# Patient Record
Sex: FEMALE | Race: WHITE | NOT HISPANIC OR LATINO | Employment: FULL TIME | URBAN - METROPOLITAN AREA
[De-identification: names, ages, dates, MRNs, and addresses within clinical notes are randomized per-mention and may not be internally consistent; named-entity substitution may affect disease eponyms.]

---

## 2016-01-27 LAB
EXTERNAL HIV CONFIRMATION: NORMAL
EXTERNAL HIV SCREEN: NORMAL
HCV AB SER-ACNC: NEGATIVE

## 2017-05-20 ENCOUNTER — HOSPITAL ENCOUNTER (EMERGENCY)
Facility: HOSPITAL | Age: 27
Discharge: HOME | End: 2017-05-20
Attending: EMERGENCY MEDICINE | Admitting: EMERGENCY MEDICINE
Payer: COMMERCIAL

## 2017-05-20 ENCOUNTER — APPOINTMENT (EMERGENCY)
Dept: CT IMAGING | Facility: HOSPITAL | Age: 27
End: 2017-05-20
Payer: COMMERCIAL

## 2017-05-20 VITALS
HEIGHT: 67 IN | RESPIRATION RATE: 16 BRPM | BODY MASS INDEX: 31.09 KG/M2 | DIASTOLIC BLOOD PRESSURE: 64 MMHG | TEMPERATURE: 98.2 F | HEART RATE: 81 BPM | OXYGEN SATURATION: 100 % | SYSTOLIC BLOOD PRESSURE: 112 MMHG | WEIGHT: 198.1 LBS

## 2017-05-20 DIAGNOSIS — R10.9 LEFT FLANK PAIN: Primary | ICD-10-CM

## 2017-05-20 LAB
ANION GAP SERPL CALCULATED.3IONS-SCNC: 12 MMOL/L (ref 4–13)
BASOPHILS # BLD AUTO: 0.05 THOUSANDS/ΜL (ref 0–0.1)
BASOPHILS NFR BLD AUTO: 1 % (ref 0–1)
BUN SERPL-MCNC: 15 MG/DL (ref 5–25)
CALCIUM SERPL-MCNC: 9.6 MG/DL (ref 8.3–10.1)
CHLORIDE SERPL-SCNC: 103 MMOL/L (ref 100–108)
CLARITY, POC: NORMAL
CO2 SERPL-SCNC: 26 MMOL/L (ref 21–32)
COLOR, POC: YELLOW
CREAT SERPL-MCNC: 0.73 MG/DL (ref 0.6–1.3)
EOSINOPHIL # BLD AUTO: 0.12 THOUSAND/ΜL (ref 0–0.61)
EOSINOPHIL NFR BLD AUTO: 1 % (ref 0–6)
ERYTHROCYTE [DISTWIDTH] IN BLOOD BY AUTOMATED COUNT: 12 % (ref 11.6–15.1)
EXT BILIRUBIN, UA: NEGATIVE
EXT BLOOD URINE: NEGATIVE
EXT GLUCOSE, UA: NEGATIVE
EXT KETONES: NEGATIVE
EXT NITRITE, UA: NEGATIVE
EXT PH, UA: 5
EXT PROTEIN, UA: NORMAL
EXT SPECIFIC GRAVITY, UA: 1.02
EXT UROBILINOGEN: 0.2
GFR SERPL CREATININE-BSD FRML MDRD: >60 ML/MIN/1.73SQ M
GLUCOSE SERPL-MCNC: 98 MG/DL (ref 65–140)
HCG UR QL: NEGATIVE
HCT VFR BLD AUTO: 42.4 % (ref 34.8–46.1)
HGB BLD-MCNC: 14.3 G/DL (ref 11.5–15.4)
LYMPHOCYTES # BLD AUTO: 3.18 THOUSANDS/ΜL (ref 0.6–4.47)
LYMPHOCYTES NFR BLD AUTO: 30 % (ref 14–44)
MCH RBC QN AUTO: 30 PG (ref 26.8–34.3)
MCHC RBC AUTO-ENTMCNC: 33.7 G/DL (ref 31.4–37.4)
MCV RBC AUTO: 89 FL (ref 82–98)
MONOCYTES # BLD AUTO: 0.8 THOUSAND/ΜL (ref 0.17–1.22)
MONOCYTES NFR BLD AUTO: 7 % (ref 4–12)
NEUTROPHILS # BLD AUTO: 6.56 THOUSANDS/ΜL (ref 1.85–7.62)
NEUTS SEG NFR BLD AUTO: 61 % (ref 43–75)
NRBC BLD AUTO-RTO: 0 /100 WBCS
PLATELET # BLD AUTO: 307 THOUSANDS/UL (ref 149–390)
PMV BLD AUTO: 10.7 FL (ref 8.9–12.7)
POTASSIUM SERPL-SCNC: 4.5 MMOL/L (ref 3.5–5.3)
RBC # BLD AUTO: 4.77 MILLION/UL (ref 3.81–5.12)
SODIUM SERPL-SCNC: 141 MMOL/L (ref 136–145)
WBC # BLD AUTO: 10.74 THOUSAND/UL (ref 4.31–10.16)
WBC # BLD EST: NEGATIVE 10*3/UL

## 2017-05-20 PROCEDURE — 74176 CT ABD & PELVIS W/O CONTRAST: CPT

## 2017-05-20 PROCEDURE — 96375 TX/PRO/DX INJ NEW DRUG ADDON: CPT

## 2017-05-20 PROCEDURE — 36415 COLL VENOUS BLD VENIPUNCTURE: CPT | Performed by: EMERGENCY MEDICINE

## 2017-05-20 PROCEDURE — 96361 HYDRATE IV INFUSION ADD-ON: CPT

## 2017-05-20 PROCEDURE — 81025 URINE PREGNANCY TEST: CPT | Performed by: EMERGENCY MEDICINE

## 2017-05-20 PROCEDURE — 81002 URINALYSIS NONAUTO W/O SCOPE: CPT | Performed by: EMERGENCY MEDICINE

## 2017-05-20 PROCEDURE — 96374 THER/PROPH/DIAG INJ IV PUSH: CPT

## 2017-05-20 PROCEDURE — 80048 BASIC METABOLIC PNL TOTAL CA: CPT | Performed by: EMERGENCY MEDICINE

## 2017-05-20 PROCEDURE — 85025 COMPLETE CBC W/AUTO DIFF WBC: CPT | Performed by: EMERGENCY MEDICINE

## 2017-05-20 PROCEDURE — 99284 EMERGENCY DEPT VISIT MOD MDM: CPT

## 2017-05-20 RX ORDER — ONDANSETRON 2 MG/ML
4 INJECTION INTRAMUSCULAR; INTRAVENOUS ONCE
Status: COMPLETED | OUTPATIENT
Start: 2017-05-20 | End: 2017-05-20

## 2017-05-20 RX ORDER — KETOROLAC TROMETHAMINE 30 MG/ML
30 INJECTION, SOLUTION INTRAMUSCULAR; INTRAVENOUS ONCE
Status: COMPLETED | OUTPATIENT
Start: 2017-05-20 | End: 2017-05-20

## 2017-05-20 RX ORDER — TRAMADOL HYDROCHLORIDE 50 MG/1
50 TABLET ORAL EVERY 6 HOURS PRN
Qty: 15 TABLET | Refills: 0 | Status: SHIPPED | OUTPATIENT
Start: 2017-05-20

## 2017-05-20 RX ADMIN — ONDANSETRON 4 MG: 2 INJECTION INTRAMUSCULAR; INTRAVENOUS at 20:26

## 2017-05-20 RX ADMIN — KETOROLAC TROMETHAMINE 30 MG: 30 INJECTION, SOLUTION INTRAMUSCULAR at 20:07

## 2017-05-20 RX ADMIN — SODIUM CHLORIDE 1000 ML: 0.9 INJECTION, SOLUTION INTRAVENOUS at 20:06

## 2019-06-05 ENCOUNTER — HOSPITAL ENCOUNTER (EMERGENCY)
Facility: HOSPITAL | Age: 29
Discharge: HOME | End: 2019-06-05
Attending: EMERGENCY MEDICINE

## 2019-06-05 VITALS
OXYGEN SATURATION: 99 % | TEMPERATURE: 98.3 F | RESPIRATION RATE: 18 BRPM | DIASTOLIC BLOOD PRESSURE: 69 MMHG | SYSTOLIC BLOOD PRESSURE: 144 MMHG | HEART RATE: 79 BPM | HEIGHT: 66 IN | WEIGHT: 240.3 LBS | BODY MASS INDEX: 38.62 KG/M2

## 2019-06-05 DIAGNOSIS — S91.339A PUNCTURE WOUND OF FOOT: Primary | ICD-10-CM

## 2019-06-05 PROCEDURE — 90715 TDAP VACCINE 7 YRS/> IM: CPT | Performed by: EMERGENCY MEDICINE

## 2019-06-05 PROCEDURE — 99283 EMERGENCY DEPT VISIT LOW MDM: CPT | Performed by: EMERGENCY MEDICINE

## 2019-06-05 PROCEDURE — 99283 EMERGENCY DEPT VISIT LOW MDM: CPT

## 2019-06-05 PROCEDURE — 90471 IMMUNIZATION ADMIN: CPT

## 2019-06-05 RX ORDER — CIPROFLOXACIN 500 MG/1
500 TABLET, FILM COATED ORAL ONCE
Status: COMPLETED | OUTPATIENT
Start: 2019-06-05 | End: 2019-06-05

## 2019-06-05 RX ORDER — CIPROFLOXACIN 500 MG/1
500 TABLET, FILM COATED ORAL 2 TIMES DAILY
Qty: 20 TABLET | Refills: 0 | Status: SHIPPED | OUTPATIENT
Start: 2019-06-05 | End: 2019-06-15

## 2019-06-05 RX ADMIN — CIPROFLOXACIN 500 MG: 500 TABLET, FILM COATED ORAL at 22:00

## 2019-06-05 RX ADMIN — TETANUS TOXOID, REDUCED DIPHTHERIA TOXOID AND ACELLULAR PERTUSSIS VACCINE, ADSORBED 0.5 ML: 5; 2.5; 8; 8; 2.5 SUSPENSION INTRAMUSCULAR at 22:01

## 2020-11-28 ENCOUNTER — AMB VIDEO VISIT (OUTPATIENT)
Dept: URGENT CARE | Facility: CLINIC | Age: 30
End: 2020-11-28

## 2020-11-28 ENCOUNTER — TELEPHONE (OUTPATIENT)
Dept: URGENT CARE | Facility: CLINIC | Age: 30
End: 2020-11-28

## 2022-11-21 ENCOUNTER — HOSPITAL ENCOUNTER (EMERGENCY)
Facility: HOSPITAL | Age: 32
Discharge: HOME | End: 2022-11-21
Attending: EMERGENCY MEDICINE

## 2022-11-21 VITALS
DIASTOLIC BLOOD PRESSURE: 88 MMHG | SYSTOLIC BLOOD PRESSURE: 143 MMHG | BODY MASS INDEX: 42.93 KG/M2 | TEMPERATURE: 97.6 F | RESPIRATION RATE: 15 BRPM | HEART RATE: 75 BPM | OXYGEN SATURATION: 99 % | WEIGHT: 266 LBS

## 2022-11-21 DIAGNOSIS — R42 DIZZINESS: Primary | ICD-10-CM

## 2022-11-21 DIAGNOSIS — R42 VERTIGO: ICD-10-CM

## 2022-11-21 LAB
ANION GAP SERPL CALCULATED.3IONS-SCNC: 9 MMOL/L (ref 4–13)
BASOPHILS # BLD AUTO: 0.05 THOUSANDS/ÂΜL (ref 0–0.1)
BASOPHILS NFR BLD AUTO: 1 % (ref 0–1)
BUN SERPL-MCNC: 17 MG/DL (ref 5–25)
CALCIUM SERPL-MCNC: 9.1 MG/DL (ref 8.3–10.1)
CARDIAC TROPONIN I PNL SERPL HS: <2 NG/L
CHLORIDE SERPL-SCNC: 104 MMOL/L (ref 96–108)
CO2 SERPL-SCNC: 27 MMOL/L (ref 21–32)
CREAT SERPL-MCNC: 0.76 MG/DL (ref 0.6–1.3)
EOSINOPHIL # BLD AUTO: 0.18 THOUSAND/ÂΜL (ref 0–0.61)
EOSINOPHIL NFR BLD AUTO: 2 % (ref 0–6)
ERYTHROCYTE [DISTWIDTH] IN BLOOD BY AUTOMATED COUNT: 12.8 % (ref 11.6–15.1)
GFR SERPL CREATININE-BSD FRML MDRD: 103 ML/MIN/1.73SQ M
GLUCOSE SERPL-MCNC: 102 MG/DL (ref 65–140)
HCT VFR BLD AUTO: 42.6 % (ref 34.8–46.1)
HGB BLD-MCNC: 13.9 G/DL (ref 11.5–15.4)
IMM GRANULOCYTES # BLD AUTO: 0.03 THOUSAND/UL (ref 0–0.2)
IMM GRANULOCYTES NFR BLD AUTO: 0 % (ref 0–2)
LYMPHOCYTES # BLD AUTO: 2.26 THOUSANDS/ÂΜL (ref 0.6–4.47)
LYMPHOCYTES NFR BLD AUTO: 24 % (ref 14–44)
MCH RBC QN AUTO: 29.1 PG (ref 26.8–34.3)
MCHC RBC AUTO-ENTMCNC: 32.6 G/DL (ref 31.4–37.4)
MCV RBC AUTO: 89 FL (ref 82–98)
MONOCYTES # BLD AUTO: 0.73 THOUSAND/ÂΜL (ref 0.17–1.22)
MONOCYTES NFR BLD AUTO: 8 % (ref 4–12)
NEUTROPHILS # BLD AUTO: 6.11 THOUSANDS/ÂΜL (ref 1.85–7.62)
NEUTS SEG NFR BLD AUTO: 65 % (ref 43–75)
NRBC BLD AUTO-RTO: 0 /100 WBCS
PLATELET # BLD AUTO: 315 THOUSANDS/UL (ref 149–390)
PMV BLD AUTO: 10.6 FL (ref 8.9–12.7)
POTASSIUM SERPL-SCNC: 4 MMOL/L (ref 3.5–5.3)
RBC # BLD AUTO: 4.77 MILLION/UL (ref 3.81–5.12)
SODIUM SERPL-SCNC: 140 MMOL/L (ref 135–147)
WBC # BLD AUTO: 9.36 THOUSAND/UL (ref 4.31–10.16)

## 2022-11-21 RX ORDER — MECLIZINE HYDROCHLORIDE 25 MG/1
25 TABLET ORAL ONCE
Status: COMPLETED | OUTPATIENT
Start: 2022-11-21 | End: 2022-11-21

## 2022-11-21 RX ORDER — DEXAMETHASONE SODIUM PHOSPHATE 10 MG/ML
10 INJECTION, SOLUTION INTRAMUSCULAR; INTRAVENOUS ONCE
Status: COMPLETED | OUTPATIENT
Start: 2022-11-21 | End: 2022-11-21

## 2022-11-21 RX ORDER — DIAZEPAM 5 MG/ML
5 INJECTION, SOLUTION INTRAMUSCULAR; INTRAVENOUS ONCE
Status: COMPLETED | OUTPATIENT
Start: 2022-11-21 | End: 2022-11-21

## 2022-11-21 RX ORDER — METOCLOPRAMIDE HYDROCHLORIDE 5 MG/ML
10 INJECTION INTRAMUSCULAR; INTRAVENOUS ONCE
Status: COMPLETED | OUTPATIENT
Start: 2022-11-21 | End: 2022-11-21

## 2022-11-21 RX ORDER — DIAZEPAM 5 MG/1
5 TABLET ORAL EVERY 8 HOURS PRN
Qty: 12 TABLET | Refills: 0 | Status: SHIPPED | OUTPATIENT
Start: 2022-11-21 | End: 2022-12-01

## 2022-11-21 RX ORDER — MECLIZINE HYDROCHLORIDE 25 MG/1
25 TABLET ORAL 3 TIMES DAILY PRN
Qty: 30 TABLET | Refills: 0 | Status: SHIPPED | OUTPATIENT
Start: 2022-11-21

## 2022-11-21 RX ADMIN — METOCLOPRAMIDE 10 MG: 5 INJECTION, SOLUTION INTRAMUSCULAR; INTRAVENOUS at 18:18

## 2022-11-21 RX ADMIN — DIAZEPAM 5 MG: 10 INJECTION, SOLUTION INTRAMUSCULAR; INTRAVENOUS at 18:18

## 2022-11-21 RX ADMIN — DEXAMETHASONE SODIUM PHOSPHATE 10 MG: 10 INJECTION INTRAMUSCULAR; INTRAVENOUS at 18:18

## 2022-11-21 RX ADMIN — SODIUM CHLORIDE 1000 ML: 0.9 INJECTION, SOLUTION INTRAVENOUS at 17:13

## 2022-11-21 RX ADMIN — MECLIZINE HYDROCHLORIDE 25 MG: 25 TABLET ORAL at 17:07

## 2022-11-21 NOTE — Clinical Note
Lei Case was seen and treated in our emergency department on 11/21/2022  Diagnosis: Vertigo    Laura   may return to work on return date  She may return on this date: 11/25/2022         If you have any questions or concerns, please don't hesitate to call        Rojas Justice MD    ______________________________           _______________          _______________  Hospital Representative                              Date                                Time

## 2022-11-21 NOTE — ED PROVIDER NOTES
History  Chief Complaint   Patient presents with   • Dizziness     Dizziness for the past 4 days  With mid sternal chest pain sharp ongoing since 1 yr  denies SOB  Recent flew and was also c/o of rt ear pain  28-year-old female no reported past history presenting with dizziness  Patient reports dizziness since Thursday  Insidious onset while at rest with room spinning dizziness  Worsened with movement and positional changes and improved with sitting still and closing eyes  No previous history of vertigo  Reports occasional nausea without vomiting  Denies any focal neurological changes such as motor or sensory deficits or any vision changes such as blurred vision or diplopia  Denies any headache or recent injury  Of note, patient reports recently flying on an airplane early last week and having sudden significant right ear pain during descent  Pain lasted for several days and has now since improved and is now occasional and very mild  Dizziness started while having ear pain  Patient also reports chronic substernal chest discomfort which is unchanged for the past year  No changes today  Denies any other complaints  Chart reviewed  History reviewed  No pertinent past medical history  Family History: non-contributory  Social History            Prior to Admission Medications   Prescriptions Last Dose Informant Patient Reported? Taking?   traMADol (ULTRAM) 50 mg tablet   No No   Sig: Take 1 tablet by mouth every 6 (six) hours as needed for moderate pain      Facility-Administered Medications: None       History reviewed  No pertinent past medical history  Past Surgical History:   Procedure Laterality Date   • ORTHOPEDIC SURGERY Right     knee  10+ yrs ago       History reviewed  No pertinent family history  I have reviewed and agree with the history as documented      E-Cigarette/Vaping     E-Cigarette/Vaping Substances     Social History     Tobacco Use   • Smoking status: Former   • Smokeless tobacco: Never   Substance Use Topics   • Alcohol use: Yes   • Drug use: No       Review of Systems   Constitutional: Negative for appetite change, chills, diaphoresis, fever and unexpected weight change  HENT: Positive for ear pain  Negative for congestion, ear discharge and rhinorrhea  Eyes: Negative for photophobia and visual disturbance  Respiratory: Negative for cough, chest tightness and shortness of breath  Cardiovascular: Negative for chest pain, palpitations and leg swelling  Gastrointestinal: Negative for abdominal distention, abdominal pain, blood in stool, constipation, diarrhea, nausea and vomiting  Genitourinary: Negative for dysuria and hematuria  Musculoskeletal: Negative for back pain, joint swelling, neck pain and neck stiffness  Skin: Negative for color change, pallor, rash and wound  Neurological: Positive for dizziness  Negative for syncope, weakness, light-headedness and headaches  Psychiatric/Behavioral: Negative for agitation  All other systems reviewed and are negative  Physical Exam  Physical Exam  Vitals and nursing note reviewed  Constitutional:       General: She is not in acute distress  Appearance: Normal appearance  She is well-developed  She is not ill-appearing, toxic-appearing or diaphoretic  HENT:      Head: Normocephalic and atraumatic  Right Ear: Ear canal and external ear normal  There is no impacted cerumen  Left Ear: Ear canal and external ear normal  There is no impacted cerumen  Ears:      Comments: No fluid behind TM but increased white substance on right possible scarring compared to left     Nose: Nose normal  No congestion or rhinorrhea  Mouth/Throat:      Mouth: Mucous membranes are moist       Pharynx: Oropharynx is clear  No oropharyngeal exudate or posterior oropharyngeal erythema  Eyes:      General: No scleral icterus  Right eye: No discharge  Left eye: No discharge        Extraocular Movements: Extraocular movements intact  Conjunctiva/sclera: Conjunctivae normal       Pupils: Pupils are equal, round, and reactive to light  Neck:      Vascular: No JVD  Trachea: No tracheal deviation  Comments: Supple  Normal range of motion  Cardiovascular:      Rate and Rhythm: Normal rate and regular rhythm  Heart sounds: Normal heart sounds  No murmur heard  No friction rub  No gallop  Comments: Normal rate and regular rhythm  Pulmonary:      Effort: Pulmonary effort is normal  No respiratory distress  Breath sounds: Normal breath sounds  No stridor  No wheezing or rales  Comments: Clear to auscultation bilaterally  Chest:      Chest wall: No tenderness  Abdominal:      General: Bowel sounds are normal  There is no distension  Palpations: Abdomen is soft  Tenderness: There is no abdominal tenderness  There is no right CVA tenderness, left CVA tenderness, guarding or rebound  Comments: Soft, nontender, nondistended  Normal bowel sounds throughout   Musculoskeletal:         General: No swelling, tenderness, deformity or signs of injury  Normal range of motion  Cervical back: Normal range of motion and neck supple  No rigidity  No muscular tenderness  Right lower leg: No edema  Left lower leg: No edema  Lymphadenopathy:      Cervical: No cervical adenopathy  Skin:     General: Skin is warm and dry  Coloration: Skin is not pale  Findings: No erythema or rash  Neurological:      General: No focal deficit present  Mental Status: She is alert and oriented to person, place, and time  Mental status is at baseline  Cranial Nerves: No cranial nerve deficit  Sensory: No sensory deficit  Motor: No weakness or abnormal muscle tone  Coordination: Coordination normal       Gait: Gait normal       Comments: A&Ox3 to person, place, and time    Horizontal nystagmus with rightward fast saccade looking left and right   CN 2-12 intact  Strength 5/5 throughout  Sensation intact throughout  Cerebellar exam including gait intact  Psychiatric:         Behavior: Behavior normal          Thought Content:  Thought content normal          Vital Signs  ED Triage Vitals [11/21/22 1529]   Temperature Pulse Respirations Blood Pressure SpO2   97 6 °F (36 4 °C) 67 16 (!) 141/111 100 %      Temp Source Heart Rate Source Patient Position - Orthostatic VS BP Location FiO2 (%)   Tympanic Monitor Sitting Left arm --      Pain Score       --           Vitals:    11/21/22 1529 11/21/22 1845   BP: (!) 141/111 143/88   Pulse: 67 75   Patient Position - Orthostatic VS: Sitting          Visual Acuity      ED Medications  Medications   meclizine (ANTIVERT) tablet 25 mg (25 mg Oral Given 11/21/22 1707)   sodium chloride 0 9 % bolus 1,000 mL (0 mL Intravenous Stopped 11/21/22 1813)   diazepam (VALIUM) injection 5 mg (5 mg Intravenous Given 11/21/22 1818)   dexamethasone (PF) (DECADRON) injection 10 mg (10 mg Intravenous Given 11/21/22 1818)   metoclopramide (REGLAN) injection 10 mg (10 mg Intravenous Given 11/21/22 1818)       Diagnostic Studies  Results Reviewed     Procedure Component Value Units Date/Time    HS Troponin 0hr (reflex protocol) [[id number]]  (Normal) Collected: 11/21/22 1712    Lab Status: Final result Specimen: Blood from Arm, Left Updated: 11/21/22 1801     hs TnI 0hr <2 ng/L     Basic metabolic panel [[id number]] Collected: 11/21/22 1712    Lab Status: Final result Specimen: Blood from Arm, Left Updated: 11/21/22 1749     Sodium 140 mmol/L      Potassium 4 0 mmol/L      Chloride 104 mmol/L      CO2 27 mmol/L      ANION GAP 9 mmol/L      BUN 17 mg/dL      Creatinine 0 76 mg/dL      Glucose 102 mg/dL      Calcium 9 1 mg/dL      eGFR 103 ml/min/1 73sq m     Narrative:      Pavel guidelines for Chronic Kidney Disease (CKD):   •  Stage 1 with normal or high GFR (GFR > 90 mL/min/1 73 square meters)  •  Stage 2 Mild CKD (GFR = 60-89 mL/min/1 73 square meters)  •  Stage 3A Moderate CKD (GFR = 45-59 mL/min/1 73 square meters)  •  Stage 3B Moderate CKD (GFR = 30-44 mL/min/1 73 square meters)  •  Stage 4 Severe CKD (GFR = 15-29 mL/min/1 73 square meters)  •  Stage 5 End Stage CKD (GFR <15 mL/min/1 73 square meters)  Note: GFR calculation is accurate only with a steady state creatinine    CBC and differential [[id number]] Collected: 11/21/22 1712    Lab Status: Final result Specimen: Blood from Arm, Left Updated: 11/21/22 1726     WBC 9 36 Thousand/uL      RBC 4 77 Million/uL      Hemoglobin 13 9 g/dL      Hematocrit 42 6 %      MCV 89 fL      MCH 29 1 pg      MCHC 32 6 g/dL      RDW 12 8 %      MPV 10 6 fL      Platelets 678 Thousands/uL      nRBC 0 /100 WBCs      Neutrophils Relative 65 %      Immat GRANS % 0 %      Lymphocytes Relative 24 %      Monocytes Relative 8 %      Eosinophils Relative 2 %      Basophils Relative 1 %      Neutrophils Absolute 6 11 Thousands/µL      Immature Grans Absolute 0 03 Thousand/uL      Lymphocytes Absolute 2 26 Thousands/µL      Monocytes Absolute 0 73 Thousand/µL      Eosinophils Absolute 0 18 Thousand/µL      Basophils Absolute 0 05 Thousands/µL                  No orders to display              Procedures  Procedures         ED Course             HEART Risk Score    Flowsheet Row Most Recent Value   Heart Score Risk Calculator    History 0 Filed at: 11/21/2022 1900   ECG 1 Filed at: 11/21/2022 1900   Age 0 Filed at: 11/21/2022 1900   Risk Factors 1 Filed at: 11/21/2022 1900   Troponin 0 Filed at: 11/21/2022 1900   HEART Score 2 Filed at: 11/21/2022 1900                        SBIRT 20yo+    Flowsheet Row Most Recent Value   SBIRT (25 yo +)    In order to provide better care to our patients, we are screening all of our patients for alcohol and drug use  Would it be okay to ask you these screening questions?  No Filed at: 11/21/2022 1812                    MDM  Number of Diagnoses or Management Options  Dizziness  Vertigo  Diagnosis management comments: 79-year-old female no reported past history presenting with dizziness  Positional dizziness in setting of recent ear pain with horizontal nystagmus rightward fast saccade  Likely peripheral vertigo related to right ear  Plan for symptomatic management with meclizine and IV fluids  Basic labs  Cardiac evaluation including EKG and troponin  Reassess  EKG normal sinus rhythm with nonspecific ST abnormalities  Labs no acute process  Symptoms minimally improved after meclizine  Almost completely resolved after additional IV medications  Ambulatory without difficulty  Referral ENT  Prescriptions sent to pharmacy  Work note  Discussed results and recommendations  Advised follow up PCP and ENT  Medication recommendations  Given instructions and return precautions  Patient/family at bedside acknowledged understanding of all written and verbal instructions and return precautions  Discharged          Amount and/or Complexity of Data Reviewed  Clinical lab tests: reviewed and ordered  Tests in the radiology section of CPT®: reviewed  Tests in the medicine section of CPT®: reviewed and ordered  Decide to obtain previous medical records or to obtain history from someone other than the patient: yes  Obtain history from someone other than the patient: yes  Review and summarize past medical records: yes  Independent visualization of images, tracings, or specimens: yes    Risk of Complications, Morbidity, and/or Mortality  Presenting problems: high  Diagnostic procedures: high  Management options: high    Patient Progress  Patient progress: improved      Disposition  Final diagnoses:   Dizziness   Vertigo     Time reflects when diagnosis was documented in both MDM as applicable and the Disposition within this note     Time User Action Codes Description Comment    11/21/2022  7:15 PM Mary Almaraz Add [R42] Dizziness     11/21/2022  7:15 PM Mary Almaraz Add [R42] Vertigo       ED Disposition     ED Disposition   Discharge    Condition   Stable    Date/Time   Mon Nov 21, 2022  7:15 PM    Comment   Sowmya Haim discharge to home/self care                 Follow-up Information     Follow up With Specialties Details Why Contact Info Additional Information    Nicolás Tracey MD Internal Medicine Schedule an appointment as soon as possible for a visit in 1 week  [address] [facility] [address]  [facility] Audiology Audiology Schedule an appointment as soon as possible for a visit in 3 days  [address] [address]  [address] [facility] Audiology, [address] [facility], [address], [place], [place], [address] [phone]          Discharge Medication List as of 11/21/2022  7:23 PM      START taking these medications    Details   diazepam (VALIUM) 5 mg tablet Take 1 tablet (5 mg total) by mouth every 8 (eight) hours as needed (dizziness) for up to 10 days, Starting Mon 11/21/2022, Until Thu 12/1/2022 at 2359, Normal      meclizine (ANTIVERT) 25 mg tablet Take 1 tablet (25 mg total) by mouth 3 (three) times a day as needed for dizziness, Starting Mon 11/21/2022, Normal         CONTINUE these medications which have NOT CHANGED    Details   traMADol (ULTRAM) 50 mg tablet Take 1 tablet by mouth every 6 (six) hours as needed for moderate pain, Starting 5/20/2017, Until Discontinued, Print                 PDMP Review     None          ED Provider  Electronically Signed by           Jesica Gandara MD  11/22/22 7574

## 2022-11-22 LAB
ATRIAL RATE: 80 BPM
P AXIS: 55 DEGREES
PR INTERVAL: 160 MS
QRS AXIS: -32 DEGREES
QRSD INTERVAL: 84 MS
QT INTERVAL: 390 MS
QTC INTERVAL: 449 MS
T WAVE AXIS: 16 DEGREES
VENTRICULAR RATE: 80 BPM

## 2022-11-22 NOTE — DISCHARGE INSTRUCTIONS
Please follow up PCP and ENT  Recommend tylenol 650 mg and ibuprofen 600 mg every 6 hours as needed for pain  Please avoid bending over or quick position changes to help prevent recurrent dizziness  Recommend taking meclizine scheduled every 8 hours for the next 2 days and then as needed for symptoms  If having bad dizziness, recommend trying Valium  Please return for severe chest pain, significant shortness of breath, severely worsening symptoms, or any other concerning signs or symptoms  Please refer to the following documents for additional instructions and return precautions

## 2022-12-12 ENCOUNTER — EVALUATION (OUTPATIENT)
Dept: PHYSICAL THERAPY | Facility: CLINIC | Age: 32
End: 2022-12-12

## 2022-12-12 DIAGNOSIS — R42 DIZZINESS: ICD-10-CM

## 2022-12-12 DIAGNOSIS — H81.11 BPPV (BENIGN PAROXYSMAL POSITIONAL VERTIGO), RIGHT: Primary | ICD-10-CM

## 2022-12-12 NOTE — PROGRESS NOTES
PT Evaluation     Today's date: 2022  Patient name: Nanda Glover  : 1990  MRN: [id number]  Referring provider: Rosa Vides*  Dx:   Encounter Diagnosis     ICD-10-CM    1  BPPV (benign paroxysmal positional vertigo), right  H81 11       2  Dizziness  R42 Ambulatory Referral to Physical Therapy        Start Time: 9120  Stop Time: 1800  Total time in clinic (min): 45 minutes    Assessment/Plan  Assessment details: Patient is a 34 y o  Male who presents to skilled outpatient PT with dizziness  She c/o room spinning dizziness and lightheadedness that is currently preventing her from working  Upon initial evaluation pt presents with abnormal vestibular oculomotor exam with dizziness, hypometric noted with saccades, smooth pursuits, and convergence (R eye exophoria)  She was negative for orthostatic hypotension  Upon positional testing, pt was positive and symptomatic with R [place]-Hallpike, negative in all other positions, indicating R posterior canalithiasis  Pt was treated with R Epley and negative upon reassessment  Significant amount of time spent completing patient education about diagnosis, prognosis, recurrence and POC  Will see pt 2-3x/week to treat residual dizziness  Patient verbalized understanding of POC  Please contact me if you have any questions or recommendations  Thank you for the referral and the opportunity to share in [address] [facility]      Impairments: abnormal coordination, abnormal gait, abnormal muscle tone, abnormal or restricted ROM, activity intolerance, impaired balance, impaired physical strength, lacks appropriate HEP, poor posture, poor body mechanics, pain with function and abnormal movement  Understanding of Dx/Px/POC: good  Prognosis: good    Goals (4 weeks)  - Patient will report complete resolution of symptoms in order to promote return to PLOF  - Patient will complete FGA in order to promote return to safe performance of ADLs  - Patient will demonstrate (-) [place]-Hallpike test on R side    Plan  Planned therapy interventions: balance, balance/WB training, breathing training, body mechanics training, coordination, flexibility, functional ROM exercises, gait training, HEP, manual therapy, motor coordination training, neuromuscular re-education, patient education, postural training, therapeutic activities and therapeutic exercises  Frequency: 2x/wk and 3x/wk  Duration in weeks: 4  Plan of Care beginning date: 2022  Plan of Care expiration date: 4 weeks - 2023  Treatment plan discussed with: Patient    Subjective Evaluation  History of Present Illness  Mechanism of injury: Pt reports she feels dizzy and lightheaded since mid November  She has seen an ENT and reports intermittent dizziness  During the flight home, she felt significant ear pain  She has to drive for work and has not been able to do so   She also has daily headaches    Dizziness Subjective  How long does dizziness last: seconds-one minute room spinning, off balance for hours afterwards  How would you describe the dizziness: lightheaded, headaches, room spinning dizziness  Rolling in bed: Yes  Supine to/from sit: Yes  Recent hearing loss: Yes  Tinnitus: Yes  Aural fullness/ear pain: Yes  Vision changes: No  History of recent viral infections: No  History of migraines: Yes    Red Flag Screen  - Tremors: No  - Poor coordination: No  - UMN signs: No  - LoC: No  - Rigidity: No  - Visual field loss: No  - Memory loss: No  - CN dysfunction: No  - Vertical nystagmus: No    Pain  Current pain ratin/10  At best pain ratin/10  At worst pain ratin/10  Location: L ear pain  Tinnitus: B/L, not new    Employment status: employed- not currently working due to inability to drive    Objective   Cervical Spine AROM:  - Flexion: WFL no pain  - Extension: WFL no pain  - R Rotation: WFL no pain  - L Rotation: WFL no pain  - R Lateral Flexion: WFL no pain  - L Lateral Flexion: WFL no pain    Coordination Screen  - Dysmetria: WFL  - Dysdiadochokinesia: WFL    Oculomotor Screen  - Gaze Holding Nystagmus: Normal  - Smooth Pursuits (central): H: Normal and V: Normal Dizziness: 0/10  - Saccades (central): H: Normal and V: Normal Dizziness: 0/10  - Near Point Convergence (normal: < 4"/10 cm - central): H: Abnormal Dizziness: 4/10, Observation: R eye exophoria  - Head Thrust (moderate to severe hypofunction): H: Normal B/L    BPPV  Integrity Testing  - mVBI: WFL  - Sharp Tra: WFL  - Alar Ligament Stability Test: WFL  - Posture: forward head    Positional Testing  - R [place]-Hallpike: Positive, upbeating torsional nystagmus lasting 30 seconds, dizziness  - L [place]-Hallpike: negative  - R Roll Test: negative  - L Roll Test: negative    Vitals:  BP: 121/94mmHg, 76bpm RUE auto sitting  BP: 121/99mmHg, 86bpm Standing RUE auto     Precautions:   Past Medical History:   Diagnosis Date   • Ear problems      Manuals 12/12            R CRT EB                                                   Neuro Re-Ed                                                                                                        Ther Ex                                                                                                                     Ther Activity             Patient Education  EB                         Gait Training                                       Modalities

## 2022-12-14 ENCOUNTER — APPOINTMENT (OUTPATIENT)
Dept: PHYSICAL THERAPY | Facility: CLINIC | Age: 32
End: 2022-12-14

## 2022-12-19 ENCOUNTER — APPOINTMENT (OUTPATIENT)
Dept: PHYSICAL THERAPY | Facility: CLINIC | Age: 32
End: 2022-12-19

## 2022-12-22 ENCOUNTER — APPOINTMENT (OUTPATIENT)
Dept: PHYSICAL THERAPY | Facility: CLINIC | Age: 32
End: 2022-12-22

## 2022-12-22 ENCOUNTER — HOSPITAL ENCOUNTER (OUTPATIENT)
Dept: CT IMAGING | Facility: HOSPITAL | Age: 32
End: 2022-12-22
Attending: OTOLARYNGOLOGY

## 2022-12-22 DIAGNOSIS — R42 DIZZINESS: ICD-10-CM

## 2023-03-30 NOTE — PROGRESS NOTES
"Jarod Adamson 1990 female MRN: [id number]      ASSESSMENT/PLAN  Problem List Items Addressed This Visit        Other    BMI 40 0-44 9, adult ([address] )    Relevant Orders    Home Study   Other Visit Diagnoses     Chest pain, unspecified type    -  Primary    Relevant Orders    Stress test only, exercise    Gastroesophageal reflux disease with esophagitis, unspecified whether hemorrhage        Relevant Medications    famotidine (PEPCID) 20 mg tablet    Snores        Relevant Orders    Home Study    Healthcare maintenance        Screening for diabetes mellitus        Relevant Orders    Comprehensive metabolic panel    Screening, lipid        Relevant Orders    Lipid panel        Chest pain has features of both cardiac and non-cardiac etiologies  Given significant FHx of CAD, will get stress test to further evaluate  Also trial Pepcid for possible reflux symptoms  Given poor sleep, snoring, and headaches in setting of elevated BMI, will check home study to evaluate for JAVID  If positive, treatment will help with both sleep and pt's weight loss goals/efforts  BP WNL   CMP + Lipids to screen for HLD, DM   HIV, Hep C screening UTD -- will link from Care Everywhere   Pap UTD -- will link from Care Everywhere   Vaccinations: TDap UTD, Flu deferred, COVID deferred  Encouraged regular physical activity, varied diet, and regular dental/eye exams     Info given for [facility]    No future appointments  SUBJECTIVE  CC: Establish Care (Patient seen in office today for a new patient to establish care - )      HPI:  Jarod Adamson is a 34 y o  female who presents to establish care  History reviewed and updated as below  Has intermittent chest pain a few times per week \"for years\" -- describes as heaviness/tightness, notes possible fast heart rate  Often happens while stressed, but sometimes occurs at rest  Can last for hours  No associated nausea, vision changes, numbness   Sometimes does note burning in her chest   " Also notes poor sleep, does snore, also has frequent headaches     Would like skin tags removed, has one on her abdomen that bleeds sometimes     Review of Systems   Constitutional: Positive for fatigue  Negative for unexpected weight change  HENT: Negative for congestion, ear pain, rhinorrhea and sore throat  Eyes: Negative for visual disturbance  Respiratory: Negative for cough and shortness of breath  Cardiovascular: Positive for chest pain and palpitations (with chest pain)  Negative for leg swelling  Gastrointestinal: Negative for abdominal pain, constipation, diarrhea and nausea  Endocrine: Negative for polyuria  Genitourinary: Negative for dysuria and menstrual problem  Musculoskeletal: Positive for back pain (from breasts)  Neurological: Positive for dizziness (intermittent vertigo) and headaches (usually Tylenol helps)  Psychiatric/Behavioral: Positive for sleep disturbance (wakes up multiple times per night; snores)         Historical Information   The patient history was reviewed and updated as follows:    Past Medical History:   Diagnosis Date   • Ear problems      Past Surgical History:   Procedure Laterality Date   • KIDNEY STONE SURGERY      x2   • MYRINGOTOMY W/ TUBES     • ORTHOPEDIC SURGERY Right     Meniscus, ACL     Family History   Problem Relation Age of Onset   • Breast cancer Mother    • Coronary artery disease Father    • Seizures Brother       Social History   Social History     Substance and Sexual Activity   Alcohol Use Yes    Comment: Once per week     Social History     Substance and Sexual Activity   Drug Use Not Currently     Social History     Tobacco Use   Smoking Status Former   • Types: Cigarettes   • Quit date:    • Years since quittin 2   Smokeless Tobacco Never   Tobacco Comments    Social, Stress        Medications:     Current Outpatient Medications:   •  famotidine (PEPCID) 20 mg tablet, Take 1 tablet (20 mg total) by mouth 2 (two) times a "day, Disp: 60 tablet, Rfl: 1  Allergies   Allergen Reactions   • Anesthesia S-I-40 [Propofol] Nausea Only       OBJECTIVE    Vitals:   Vitals:    03/31/23 1302   BP: 124/82   BP Location: Left arm   Patient Position: Sitting   Cuff Size: Large   Temp: (!) 97 °F (36 1 °C)   Weight: 126 kg (277 lb)   Height: 5' 6\" (1 676 m)           Physical Exam  Vitals and nursing note reviewed  Constitutional:       General: She is not in acute distress  Appearance: Normal appearance  HENT:      Head: Normocephalic and atraumatic  Right Ear: Tympanic membrane, ear canal and external ear normal       Left Ear: Tympanic membrane, ear canal and external ear normal       Nose: Nose normal       Mouth/Throat:      Mouth: Mucous membranes are moist       Pharynx: No oropharyngeal exudate or posterior oropharyngeal erythema  Eyes:      Conjunctiva/sclera: Conjunctivae normal    Cardiovascular:      Rate and Rhythm: Normal rate and regular rhythm  Pulmonary:      Effort: Pulmonary effort is normal  No respiratory distress  Breath sounds: Normal breath sounds  Abdominal:      General: Bowel sounds are normal  There is no distension  Palpations: Abdomen is soft  Tenderness: There is no abdominal tenderness  Musculoskeletal:      Right lower leg: No edema  Left lower leg: No edema  Lymphadenopathy:      Cervical: No cervical adenopathy  Skin:     General: Skin is warm and dry  Comments: Sub-centimeter pedunculated red lesion suggestive of hemangioma on abdomen   Neurological:      General: No focal deficit present  Mental Status: She is alert     Psychiatric:         Mood and Affect: Mood normal                     DO Tiara [address] [facility]   3/31/2023  1:36 PM    "

## 2023-03-31 ENCOUNTER — OFFICE VISIT (OUTPATIENT)
Dept: FAMILY MEDICINE CLINIC | Facility: CLINIC | Age: 33
End: 2023-03-31

## 2023-03-31 VITALS
DIASTOLIC BLOOD PRESSURE: 82 MMHG | TEMPERATURE: 97 F | WEIGHT: 277 LBS | HEIGHT: 66 IN | BODY MASS INDEX: 44.52 KG/M2 | SYSTOLIC BLOOD PRESSURE: 124 MMHG

## 2023-03-31 DIAGNOSIS — R07.9 CHEST PAIN, UNSPECIFIED TYPE: Primary | ICD-10-CM

## 2023-03-31 DIAGNOSIS — K21.00 GASTROESOPHAGEAL REFLUX DISEASE WITH ESOPHAGITIS, UNSPECIFIED WHETHER HEMORRHAGE: ICD-10-CM

## 2023-03-31 DIAGNOSIS — Z13.220 SCREENING, LIPID: ICD-10-CM

## 2023-03-31 DIAGNOSIS — Z00.00 HEALTHCARE MAINTENANCE: ICD-10-CM

## 2023-03-31 DIAGNOSIS — Z13.1 SCREENING FOR DIABETES MELLITUS: ICD-10-CM

## 2023-03-31 DIAGNOSIS — R06.83 SNORES: ICD-10-CM

## 2023-03-31 PROBLEM — R42 VERTIGO: Status: ACTIVE | Noted: 2023-03-31

## 2023-03-31 RX ORDER — PREDNISONE 20 MG/1
TABLET ORAL
COMMUNITY
Start: 2023-03-28 | End: 2023-03-31

## 2023-03-31 RX ORDER — FAMOTIDINE 20 MG/1
20 TABLET, FILM COATED ORAL 2 TIMES DAILY
Qty: 60 TABLET | Refills: 1 | Status: SHIPPED | OUTPATIENT
Start: 2023-03-31

## 2023-03-31 RX ORDER — BENZONATATE 200 MG/1
CAPSULE ORAL
COMMUNITY
Start: 2023-03-28 | End: 2023-03-31

## 2023-04-03 ENCOUNTER — TELEPHONE (OUTPATIENT)
Dept: ADMINISTRATIVE | Facility: OTHER | Age: 33
End: 2023-04-03

## 2023-04-03 NOTE — TELEPHONE ENCOUNTER
----- Message from Evelyn Guzman MA sent at 3/31/2023  1:06 PM EDT -----  Regarding: Care Gap Request  03/31/23 1:06 PM    Hello, our patient Omayra Maradiaga has had Pap Smear (HPV) aka Cervical Cancer Screening completed/performed  Please assist in updating the patient chart by pulling the Care Everywhere (CE) document  The date of service is 3/20/2023       Thank you,  Ivis MODI

## 2023-04-04 DIAGNOSIS — R06.83 SNORES: Primary | ICD-10-CM

## 2023-04-06 NOTE — TELEPHONE ENCOUNTER
Upon review of the In Basket request we were able to locate, review, and update the patient chart as requested for Hepatitis C , HIV and Pap Smear (HPV) aka Cervical Cancer Screening  Any additional questions or concerns should be emailed to the Practice Liaisons via the appropriate education email address, please do not reply via In Basket      Thank you  Domitila Dover

## 2023-04-24 ENCOUNTER — HOSPITAL ENCOUNTER (OUTPATIENT)
Dept: NON INVASIVE DIAGNOSTICS | Facility: CLINIC | Age: 33
Discharge: HOME | End: 2023-04-24

## 2023-05-01 ENCOUNTER — HOSPITAL ENCOUNTER (OUTPATIENT)
Dept: NON INVASIVE DIAGNOSTICS | Facility: CLINIC | Age: 33
Discharge: HOME | End: 2023-05-01

## 2023-05-01 VITALS
WEIGHT: 277 LBS | HEART RATE: 100 BPM | BODY MASS INDEX: 44.52 KG/M2 | HEIGHT: 66 IN | DIASTOLIC BLOOD PRESSURE: 82 MMHG | OXYGEN SATURATION: 99 % | SYSTOLIC BLOOD PRESSURE: 126 MMHG

## 2023-05-01 DIAGNOSIS — R07.9 CHEST PAIN, UNSPECIFIED TYPE: ICD-10-CM

## 2023-05-01 LAB
MAX HR PERCENT: 81 %
MAX HR: 153 BPM
RATE PRESSURE PRODUCT: NORMAL
SL CV STRESS RECOVERY BP: NORMAL MMHG
SL CV STRESS RECOVERY HR: 90 BPM
SL CV STRESS RECOVERY O2 SAT: 99 %
SL CV STRESS STAGE REACHED: 3
STRESS ANGINA INDEX: 1
STRESS BASELINE BP: NORMAL MMHG
STRESS BASELINE HR: 100 BPM
STRESS O2 SAT REST: 99 %
STRESS PEAK HR: 153 BPM
STRESS POST ESTIMATED WORKLOAD: 8.5 METS
STRESS POST EXERCISE DUR MIN: 6 MIN
STRESS POST EXERCISE DUR SEC: 30 SEC
STRESS POST O2 SAT PEAK: 99 %
STRESS POST PEAK BP: 164 MMHG

## 2023-05-02 LAB
CHEST PAIN STATEMENT: NORMAL
CHEST PAIN STATEMENT: NORMAL
MAX DIASTOLIC BP: 90 MMHG
MAX DIASTOLIC BP: 90 MMHG
MAX HEART RATE: 153 BPM
MAX HEART RATE: 153 BPM
MAX PREDICTED HEART RATE: 187 BPM
MAX PREDICTED HEART RATE: 187 BPM
MAX. SYSTOLIC BP: 164 MMHG
MAX. SYSTOLIC BP: 164 MMHG
PROTOCOL NAME: NORMAL
PROTOCOL NAME: NORMAL
TARGET HR FORMULA: NORMAL
TARGET HR FORMULA: NORMAL
TEST INDICATION: NORMAL
TEST INDICATION: NORMAL
TIME IN EXERCISE PHASE: NORMAL
TIME IN EXERCISE PHASE: NORMAL

## 2023-05-05 ENCOUNTER — TELEPHONE (OUTPATIENT)
Dept: SLEEP CENTER | Facility: CLINIC | Age: 33
End: 2023-05-05

## 2023-05-05 NOTE — TELEPHONE ENCOUNTER
----- Message from Buddy Leyden, MD sent at 5/4/2023  7:07 PM EDT -----  approved  ----- Message -----  From: Oliva Dyer  Sent: 9/9/5814   9:15 AM EDT  To: Sleep Medicine [facility] Provider    This Diagnostic sleep study needs approval      If approved please sign and return to clerical pool  If denied please include reasons why  Also provide alternative testing if warranted  Please sign and return to clerical pool

## 2023-05-09 ENCOUNTER — HOSPITAL ENCOUNTER (OUTPATIENT)
Dept: SLEEP CENTER | Facility: CLINIC | Age: 33
Discharge: HOME | End: 2023-05-09

## 2023-05-09 DIAGNOSIS — R06.83 SNORES: ICD-10-CM

## 2023-05-10 NOTE — PROGRESS NOTES
Sleep Study Documentation    Pre-Sleep Study       Sleep testing procedure explained to patient:YES    Patient napped prior to study:YES- less than 30 minutes  Napped after 2PM: yes    Caffeine:Dayshift worker after 12PM   Caffeine use:YES- coffee  6 to 18 ounces    Alcohol:Dayshift workers after 5PM: Alcohol use:NO    Typical day for patient:YES       Study Documentation    Sleep Study Indications: snoring and BMI > 30    Sleep Study: Diagnostic   Snore: soft to moderate  Supplemental O2: no    Minimum SaO2 92  Baseline SaO2 94        EKG abnormalities: no     EEG abnormalities: no    Sleep Study Recorded < 2 hours: N/A    Sleep Study Recorded > 2 hours but incomplete study: N/A    Sleep Study Recorded 6 hours but no sleep obtained: NO        Post-Sleep Study    Medication used at bedtime or during sleep study:NO    Patient reports time it took to fall asleep:greater than 60 minutes    Patient reports waking up during study:3 or more times  Patient reports returning to sleep in greater than 30 minutes  Patient reports sleeping 2 to 4 hours without dreaming  Patient reports sleep during study:typical    Patient rated sleepiness: Somewhat sleepy or tired    PAP treatment:no

## 2023-05-17 DIAGNOSIS — G47.33 OSA (OBSTRUCTIVE SLEEP APNEA): Primary | ICD-10-CM

## 2023-05-25 ENCOUNTER — TELEPHONE (OUTPATIENT)
Dept: SLEEP CENTER | Facility: CLINIC | Age: 33
End: 2023-05-25

## 2023-05-25 NOTE — TELEPHONE ENCOUNTER
----- Message from Luis Vail MD sent at 5/24/2023  8:30 PM EDT -----  approved  ----- Message -----  From: Dru Davenport  Sent: 5/23/2023  10:03 AM EDT  To: Sleep Medicine [facility] Provider    This CPAP sleep study needs approval      If approved please sign and return to clerical pool  If denied please include reasons why  Also provide alternative testing if warranted  Please sign and return to clerical pool

## 2023-06-28 ENCOUNTER — TELEPHONE (OUTPATIENT)
Dept: FAMILY MEDICINE CLINIC | Facility: CLINIC | Age: 33
End: 2023-06-28

## 2023-06-28 NOTE — TELEPHONE ENCOUNTER
Pt requesting medical exemption for COVID vaccine for new job  Discussed that unless she has known allergy to vaccine/vaccine components I can't medically excuse her from the vaccine  Pt expressed understanding

## 2024-02-21 ENCOUNTER — OFFICE VISIT (OUTPATIENT)
Dept: FAMILY MEDICINE CLINIC | Facility: CLINIC | Age: 34
End: 2024-02-21
Payer: COMMERCIAL

## 2024-02-21 VITALS
HEART RATE: 86 BPM | SYSTOLIC BLOOD PRESSURE: 130 MMHG | OXYGEN SATURATION: 98 % | DIASTOLIC BLOOD PRESSURE: 96 MMHG | HEIGHT: 66 IN | WEIGHT: 249 LBS | TEMPERATURE: 98.4 F | BODY MASS INDEX: 40.02 KG/M2

## 2024-02-21 DIAGNOSIS — Z11.59 SCREENING FOR VIRAL DISEASE: ICD-10-CM

## 2024-02-21 DIAGNOSIS — U07.1 COVID-19: Primary | ICD-10-CM

## 2024-02-21 LAB
SARS-COV-2 AG UPPER RESP QL IA: POSITIVE
SL AMB POCT RAPID FLU A: NEGATIVE
SL AMB POCT RAPID FLU B: NEGATIVE
VALID CONTROL: ABNORMAL

## 2024-02-21 PROCEDURE — 99214 OFFICE O/P EST MOD 30 MIN: CPT | Performed by: FAMILY MEDICINE

## 2024-02-21 PROCEDURE — 87811 SARS-COV-2 COVID19 W/OPTIC: CPT | Performed by: FAMILY MEDICINE

## 2024-02-21 PROCEDURE — 87804 INFLUENZA ASSAY W/OPTIC: CPT | Performed by: FAMILY MEDICINE

## 2024-02-21 RX ORDER — NIRMATRELVIR AND RITONAVIR 300-100 MG
3 KIT ORAL 2 TIMES DAILY
Qty: 30 TABLET | Refills: 0 | Status: SHIPPED | OUTPATIENT
Start: 2024-02-21 | End: 2024-02-26

## 2024-02-21 NOTE — PROGRESS NOTES
"Carley Adorno 1990 female MRN: [id number]      ASSESSMENT/PLAN  Problem List Items Addressed This Visit    None  Visit Diagnoses     COVID-19    -  Primary    Relevant Medications    nirmatrelvir & ritonavir (Paxlovid, 300/100,) tablet therapy pack    Screening for viral disease        Relevant Orders    POCT rapid flu A and B    POCT Rapid Covid Ag                No future appointments.         SUBJECTIVE  CC: Fever (Chills, sore throat, stuffy) and COVID-19      HPI:  Carley Adorno is a 33 y.o. female who presents due to acute illness.     Onset 2/19 -- started with cough  (+) nasal congestion, rhinorrhea/post-nasal drip, sore throat, productive cough, myalgia  Taking Ibuprofen, Mucinex   Of note, pt was flu (+) 12/29 and then had another cold about two weeks ago     Review of Systems   Constitutional:  Positive for chills, diaphoresis and fever.   HENT:  Positive for congestion, postnasal drip, rhinorrhea and sore throat. Negative for ear pain.    Respiratory:  Positive for cough and shortness of breath (\"a little\").    Gastrointestinal:  Negative for abdominal pain, diarrhea and vomiting.   Musculoskeletal:  Positive for myalgias.   Neurological:  Negative for headaches.       Historical Information   The patient history was reviewed and updated as follows:    Past Medical History:   Diagnosis Date   • Ear problems      Past Surgical History:   Procedure Laterality Date   • KIDNEY STONE SURGERY      x2   • MYRINGOTOMY W/ TUBES     • ORTHOPEDIC SURGERY Right     Meniscus, ACL     Family History   Problem Relation Age of Onset   • Breast cancer Mother    • Coronary artery disease Father    • Seizures Brother       Social History   Social History     Substance and Sexual Activity   Alcohol Use Yes    Comment: Once per week     Social History     Substance and Sexual Activity   Drug Use Not Currently     Social History     Tobacco Use   Smoking Status Former   • Current packs/day: 0.00   • Types: Cigarettes " "  • Quit date: 2021   • Years since quitting: 3.1   Smokeless Tobacco Never   Tobacco Comments    Social, Stress        Medications:     Current Outpatient Medications:   •  nirmatrelvir & ritonavir (Paxlovid, 300/100,) tablet therapy pack, Take 3 tablets by mouth 2 (two) times a day for 5 days Take 2 nirmatrelvir tablets + 1 ritonavir tablet together per dose, Disp: 30 tablet, Rfl: 0  •  famotidine (PEPCID) 20 mg tablet, Take 1 tablet (20 mg total) by mouth 2 (two) times a day, Disp: 60 tablet, Rfl: 1  Allergies   Allergen Reactions   • Anesthesia S-I-40 [Propofol] Nausea Only       OBJECTIVE    Vitals:   Vitals:    02/21/24 1509   BP: 130/96   Pulse: 86   Temp: 98.4 °F (36.9 °C)   SpO2: 98%   Weight: 113 kg (249 lb)   Height: 5' 6\" (1.676 m)           Physical Exam               Lena Guzman DO  [facility]   2/21/2024  3:27 PM    "

## 2024-02-21 NOTE — PROGRESS NOTES
COVID-19 Outpatient Progress Note    Assessment/Plan:    Problem List Items Addressed This Visit    None  Visit Diagnoses     COVID-19    -  Primary    Relevant Medications    nirmatrelvir & ritonavir (Paxlovid, 300/100,) tablet therapy pack    Screening for viral disease        Relevant Orders    POCT rapid flu A and B (Completed)    POCT Rapid Covid Ag (Completed)         Disposition:     Rapid antigen testing was performed and the result is POSITIVE for COVID-19. Patient with asymptomatic/mild COVID-19: They were recommended to isolate for at least 5 days (day 0 is the day symptoms appeared or the date the specimen was collected for the positive test for people who are asymptomatic). If they are asymptomatic or symptoms are improving with no fevers in the past 24 hours, isolation may be ended followed by 5 days of wearing a high quality mask when around others to minimize risk of infecting others. They should wear a mask through day 10 and a test-based strategy may be used to remove a mask sooner.      Discussed symptom directed medication options with patient. Discussed vitamin D, vitamin C, and/or zinc supplementation with patient.     Pt is at increased risk of complication given elevated BMI, agreeable to Paxlovid as below     Patient meets criteria for Paxlovid and they have been counseled appropriately regarding risks, benefits, side effects, and alternative treatment options. After discussion, patient agrees to treatment.    Possible side effects of Paxlovid?    Possible side effects of Paxlovid are:  - Liver Problems. Notify us right away if you start to experience loss of appetite, yellowing of your skin and the whites of eyes (jaundice), dark-colored urine, pale colored stools and itchy skin, stomach area (abdominal) pain.  - Resistance to HIV Medicines. If you have untreated HIV infection, Paxlovid may lead to some HIV medicines not working as well in the future.  - Other possible side effects include:  "altered sense of taste, diarrhea, high blood pressure, or muscle aches.    I have spent a total time of 10 minutes on the day of the encounter for this patient including       Encounter provider: Lena Guzman DO     Provider located at: [facility]  [address]  [facility] [address]     Recent Visits  No visits were found meeting these conditions.  Showing recent visits within past 7 days and meeting all other requirements  Today's Visits  Date Type Provider Dept   02/21/24 Office Visit Lena Guzman DO [facility]   Showing today's visits and meeting all other requirements  Future Appointments  No visits were found meeting these conditions.  Showing future appointments within next 150 days and meeting all other requirements     Subjective:   Carley Adorno is a 33 y.o. female who is concerned about COVID-19. Patient's symptoms include fever, chills, nasal congestion, rhinorrhea, sore throat, cough, shortness of breath (\"a little\") and myalgias. Patient denies abdominal pain, vomiting, diarrhea and headaches.     - Date of symptom onset: 2/19/2024      COVID-19 vaccination status: Not vaccinated    Lab Results   Component Value Date    SARSCOVAG Positive (A) 02/21/2024       Review of Systems   Constitutional:  Positive for chills, diaphoresis and fever.   HENT:  Positive for congestion, postnasal drip, rhinorrhea and sore throat. Negative for ear pain.    Respiratory:  Positive for cough and shortness of breath (\"a little\").    Gastrointestinal:  Negative for abdominal pain, diarrhea and vomiting.   Musculoskeletal:  Positive for myalgias.   Neurological:  Negative for headaches.     Current Outpatient Medications on File Prior to Visit   Medication Sig   • famotidine (PEPCID) 20 mg tablet Take 1 tablet (20 mg total) by mouth 2 (two) times a day       Objective:    /96   Pulse 86   Temp 98.4 °F (36.9 °C)   Ht 5' 6\" (1.676 m)   Wt 113 kg (249 " lb)   SpO2 98%   BMI 40.19 kg/m²        Physical Exam  Vitals and nursing note reviewed.   Constitutional:       General: She is not in acute distress.     Appearance: She is well-developed.   HENT:      Head: Normocephalic and atraumatic.      Right Ear: Tympanic membrane, ear canal and external ear normal.      Left Ear: Tympanic membrane, ear canal and external ear normal.      Nose: Rhinorrhea present.      Comments: (+) audibly congested     Mouth/Throat:      Mouth: Mucous membranes are moist.      Pharynx: Posterior oropharyngeal erythema present. No oropharyngeal exudate.   Eyes:      Conjunctiva/sclera: Conjunctivae normal.   Neck:      Thyroid: No thyromegaly.   Cardiovascular:      Rate and Rhythm: Normal rate and regular rhythm.   Pulmonary:      Effort: Pulmonary effort is normal. No respiratory distress.      Breath sounds: Normal breath sounds. No wheezing or rhonchi.   Lymphadenopathy:      Cervical: No cervical adenopathy.   Skin:     General: Skin is warm and dry.   Neurological:      Mental Status: She is alert.      Comments: Grossly intact   Psychiatric:         Mood and Affect: Mood normal.       Lena Guzman,

## 2024-07-22 ENCOUNTER — TELEPHONE (OUTPATIENT)
Age: 34
End: 2024-07-22

## 2024-07-22 DIAGNOSIS — Z13.220 SCREENING, LIPID: ICD-10-CM

## 2024-07-22 DIAGNOSIS — Z13.1 SCREENING FOR DIABETES MELLITUS: ICD-10-CM

## 2024-07-22 NOTE — TELEPHONE ENCOUNTER
Patient called to request lab orders. She states, she had lab orders a year ago but never got them done. Patient would appreciate a call back when labs are ordered.

## 2025-01-06 ENCOUNTER — NURSE TRIAGE (OUTPATIENT)
Age: 35
End: 2025-01-06

## 2025-01-06 ENCOUNTER — APPOINTMENT (EMERGENCY)
Dept: RADIOLOGY | Facility: HOSPITAL | Age: 35
End: 2025-01-06
Payer: COMMERCIAL

## 2025-01-06 ENCOUNTER — HOSPITAL ENCOUNTER (EMERGENCY)
Facility: HOSPITAL | Age: 35
Discharge: HOME | End: 2025-01-06
Attending: EMERGENCY MEDICINE
Payer: COMMERCIAL

## 2025-01-06 VITALS
SYSTOLIC BLOOD PRESSURE: 119 MMHG | TEMPERATURE: 97.7 F | HEART RATE: 69 BPM | RESPIRATION RATE: 22 BRPM | OXYGEN SATURATION: 99 % | DIASTOLIC BLOOD PRESSURE: 70 MMHG

## 2025-01-06 DIAGNOSIS — M94.0 COSTOCHONDRITIS: Primary | ICD-10-CM

## 2025-01-06 LAB
ALBUMIN SERPL BCG-MCNC: 4.5 G/DL (ref 3.5–5)
ALP SERPL-CCNC: 85 U/L (ref 34–104)
ALT SERPL W P-5'-P-CCNC: 18 U/L (ref 7–52)
ANION GAP SERPL CALCULATED.3IONS-SCNC: 6 MMOL/L (ref 4–13)
AST SERPL W P-5'-P-CCNC: 14 U/L (ref 13–39)
BASOPHILS # BLD AUTO: 0.07 THOUSANDS/ΜL (ref 0–0.1)
BASOPHILS NFR BLD AUTO: 1 % (ref 0–1)
BILIRUB SERPL-MCNC: 0.44 MG/DL (ref 0.2–1)
BILIRUB UR QL STRIP: NEGATIVE
BUN SERPL-MCNC: 15 MG/DL (ref 5–25)
CALCIUM SERPL-MCNC: 9.2 MG/DL (ref 8.4–10.2)
CARDIAC TROPONIN I PNL SERPL HS: <2 NG/L (ref ?–50)
CARDIAC TROPONIN I PNL SERPL HS: <2 NG/L (ref ?–50)
CHLORIDE SERPL-SCNC: 104 MMOL/L (ref 96–108)
CLARITY UR: CLEAR
CO2 SERPL-SCNC: 27 MMOL/L (ref 21–32)
COLOR UR: NORMAL
CREAT SERPL-MCNC: 0.69 MG/DL (ref 0.6–1.3)
D DIMER PPP FEU-MCNC: 0.31 UG/ML FEU
EOSINOPHIL # BLD AUTO: 0.19 THOUSAND/ΜL (ref 0–0.61)
EOSINOPHIL NFR BLD AUTO: 2 % (ref 0–6)
ERYTHROCYTE [DISTWIDTH] IN BLOOD BY AUTOMATED COUNT: 12.5 % (ref 11.6–15.1)
EXT PREGNANCY TEST URINE: NEGATIVE
EXT. CONTROL: NORMAL
FLUAV AG UPPER RESP QL IA.RAPID: NEGATIVE
FLUBV AG UPPER RESP QL IA.RAPID: NEGATIVE
GFR SERPL CREATININE-BSD FRML MDRD: 113 ML/MIN/1.73SQ M
GLUCOSE SERPL-MCNC: 84 MG/DL (ref 65–140)
GLUCOSE UR STRIP-MCNC: NEGATIVE MG/DL
HCT VFR BLD AUTO: 40.6 % (ref 34.8–46.1)
HGB BLD-MCNC: 13.7 G/DL (ref 11.5–15.4)
HGB UR QL STRIP.AUTO: NEGATIVE
IMM GRANULOCYTES # BLD AUTO: 0.04 THOUSAND/UL (ref 0–0.2)
IMM GRANULOCYTES NFR BLD AUTO: 0 % (ref 0–2)
KETONES UR STRIP-MCNC: NEGATIVE MG/DL
LEUKOCYTE ESTERASE UR QL STRIP: NEGATIVE
LIPASE SERPL-CCNC: 19 U/L (ref 11–82)
LYMPHOCYTES # BLD AUTO: 2.36 THOUSANDS/ΜL (ref 0.6–4.47)
LYMPHOCYTES NFR BLD AUTO: 25 % (ref 14–44)
MCH RBC QN AUTO: 29.3 PG (ref 26.8–34.3)
MCHC RBC AUTO-ENTMCNC: 33.7 G/DL (ref 31.4–37.4)
MCV RBC AUTO: 87 FL (ref 82–98)
MONOCYTES # BLD AUTO: 0.77 THOUSAND/ΜL (ref 0.17–1.22)
MONOCYTES NFR BLD AUTO: 8 % (ref 4–12)
NEUTROPHILS # BLD AUTO: 6.12 THOUSANDS/ΜL (ref 1.85–7.62)
NEUTS SEG NFR BLD AUTO: 64 % (ref 43–75)
NITRITE UR QL STRIP: NEGATIVE
NRBC BLD AUTO-RTO: 0 /100 WBCS
PH UR STRIP.AUTO: 5.5 [PH]
PLATELET # BLD AUTO: 311 THOUSANDS/UL (ref 149–390)
PMV BLD AUTO: 10.3 FL (ref 8.9–12.7)
POTASSIUM SERPL-SCNC: 4.2 MMOL/L (ref 3.5–5.3)
PROT SERPL-MCNC: 7.3 G/DL (ref 6.4–8.4)
PROT UR STRIP-MCNC: NEGATIVE MG/DL
RBC # BLD AUTO: 4.68 MILLION/UL (ref 3.81–5.12)
SARS-COV+SARS-COV-2 AG RESP QL IA.RAPID: NEGATIVE
SODIUM SERPL-SCNC: 137 MMOL/L (ref 135–147)
SP GR UR STRIP.AUTO: 1.02 (ref 1–1.03)
UROBILINOGEN UR STRIP-ACNC: <2 MG/DL
WBC # BLD AUTO: 9.55 THOUSAND/UL (ref 4.31–10.16)

## 2025-01-06 PROCEDURE — 36415 COLL VENOUS BLD VENIPUNCTURE: CPT | Performed by: EMERGENCY MEDICINE

## 2025-01-06 PROCEDURE — 96374 THER/PROPH/DIAG INJ IV PUSH: CPT

## 2025-01-06 PROCEDURE — 71045 X-RAY EXAM CHEST 1 VIEW: CPT

## 2025-01-06 PROCEDURE — 83690 ASSAY OF LIPASE: CPT | Performed by: EMERGENCY MEDICINE

## 2025-01-06 PROCEDURE — 80053 COMPREHEN METABOLIC PANEL: CPT | Performed by: EMERGENCY MEDICINE

## 2025-01-06 PROCEDURE — 84484 ASSAY OF TROPONIN QUANT: CPT | Performed by: EMERGENCY MEDICINE

## 2025-01-06 PROCEDURE — 96375 TX/PRO/DX INJ NEW DRUG ADDON: CPT

## 2025-01-06 PROCEDURE — 85025 COMPLETE CBC W/AUTO DIFF WBC: CPT | Performed by: EMERGENCY MEDICINE

## 2025-01-06 PROCEDURE — 85379 FIBRIN DEGRADATION QUANT: CPT | Performed by: EMERGENCY MEDICINE

## 2025-01-06 PROCEDURE — 99285 EMERGENCY DEPT VISIT HI MDM: CPT

## 2025-01-06 PROCEDURE — 87804 INFLUENZA ASSAY W/OPTIC: CPT | Performed by: EMERGENCY MEDICINE

## 2025-01-06 PROCEDURE — 99284 EMERGENCY DEPT VISIT MOD MDM: CPT | Performed by: EMERGENCY MEDICINE

## 2025-01-06 PROCEDURE — 87811 SARS-COV-2 COVID19 W/OPTIC: CPT | Performed by: EMERGENCY MEDICINE

## 2025-01-06 PROCEDURE — 81025 URINE PREGNANCY TEST: CPT | Performed by: EMERGENCY MEDICINE

## 2025-01-06 PROCEDURE — 93005 ELECTROCARDIOGRAM TRACING: CPT

## 2025-01-06 PROCEDURE — 81003 URINALYSIS AUTO W/O SCOPE: CPT | Performed by: EMERGENCY MEDICINE

## 2025-01-06 RX ORDER — MECLIZINE HYDROCHLORIDE 25 MG/1
25 TABLET ORAL ONCE
Status: COMPLETED | OUTPATIENT
Start: 2025-01-06 | End: 2025-01-06

## 2025-01-06 RX ORDER — ONDANSETRON 2 MG/ML
4 INJECTION INTRAMUSCULAR; INTRAVENOUS ONCE
Status: COMPLETED | OUTPATIENT
Start: 2025-01-06 | End: 2025-01-06

## 2025-01-06 RX ORDER — KETOROLAC TROMETHAMINE 30 MG/ML
15 INJECTION, SOLUTION INTRAMUSCULAR; INTRAVENOUS ONCE
Status: COMPLETED | OUTPATIENT
Start: 2025-01-06 | End: 2025-01-06

## 2025-01-06 RX ADMIN — MECLIZINE HYDROCHLORIDE 25 MG: 25 TABLET ORAL at 13:37

## 2025-01-06 RX ADMIN — KETOROLAC TROMETHAMINE 15 MG: 30 INJECTION, SOLUTION INTRAMUSCULAR at 13:39

## 2025-01-06 RX ADMIN — ONDANSETRON 4 MG: 2 INJECTION INTRAMUSCULAR; INTRAVENOUS at 13:38

## 2025-01-06 NOTE — TELEPHONE ENCOUNTER
"Received call via warm transfer from [place].     Carley reports right upper back pain for two days, pain wrapping around to chest since last night. She reports it feels like something is sitting on her chest, reports worse discomfort with cough, which started this morning.  She denies shortness of breath, jaw or shoulder pain, weakness, dizziness, other symptoms.    Advised Emergency Department for evaluation. Carley prefers not to go to ED unless symptoms worsen. She was scheduled by [place] for office visit with PCP tomorrow. She would like to keep that appointment.     Will forward to PCP for recommendation. Reiterated importance of ED evaluation for chest pressure/pain.        Reason for Disposition   Chest pain lasting longer than 5 minutes, over 30 years old, and at least one cardiac risk factor (e.g., diabetes mellitus, high blood pressure, high cholesterol, obesity with BMI 30 or higher, smoker, or strong family history of heart disease)    Answer Assessment - Initial Assessment Questions  1. LOCATION: \"Where does it hurt?\"        Right upper back wrapping around to chest causing feeling of pressure in chest    2. RADIATION: \"Does the pain go anywhere else?\" (e.g., into neck, jaw, arms, back)      From back around to chest    3. ONSET: \"When did the chest pain begin?\" (Minutes, hours or days)       Back pain started 2 days ago, chest pressure started last night    4. PATTERN: \"Does the pain come and go, or has it been constant since it started?\"  \"Does it get worse with exertion?\"       Constant, worse with coughing (started this morning)    5. CAUSE: \"What do you think is causing the chest pain?\"      Pt unsure    6. OTHER SYMPTOMS: \"Do you have any other symptoms?\" (e.g., dizziness, nausea, vomiting, sweating, fever, difficulty breathing, cough)        Denies    Protocols used: Chest Pain-Adult-[place]    "

## 2025-01-06 NOTE — ED PROVIDER NOTES
Time reflects when diagnosis was documented in both MDM as applicable and the Disposition within this note       Time User Action Codes Description Comment    1/6/2025  4:16 PM Thalia Chadwick Add [M94.0] Costochondritis           ED Disposition       ED Disposition   Discharge    Condition   Stable    Date/Time   Mon Jan 6, 2025  4:16 PM    Comment   Carley Adorno discharge to home/self care.                   Assessment & Plan       Medical Decision Making  Patient is an otherwise healthy 34-year-old female who presents to the emergency department with right-sided chest and back pain.  She describes pain starting in her right upper thoracic back radiating anteriorly to her chest.  She reports significant pain with palpation, and deep inspiration.  Notes that she started to have a cough this morning but denies other complaints such as fever or sputum production.  Otherwise denies recent illness.  Reports strong family history of cardiac disease but denies known history of DVT or PE.  Denies injury, trauma, or change in activity.    Amount and/or Complexity of Data Reviewed  External Data Reviewed: ECG.     Details: No  Labs: ordered. Decision-making details documented in ED Course.  Radiology: ordered.  ECG/medicine tests: independent interpretation performed.     Details: Normal sinus rhythm at 72 bpm, normal axis, normal intervals, anteroseptal Q waves, borderline abnormal EKG  unchanged from prior    Risk  Prescription drug management.  Decision regarding hospitalization.        ED Course as of 01/06/25 1817   Mon Jan 06, 2025   1419 D-Dimer   1457 HS Troponin 0hr (reflex protocol)   1458 FLU/COVID Rapid Antigen (30 min. TAT) - Preferred screening test in ED   1616 HS Troponin I 2hr       Medications   ketorolac (TORADOL) injection 15 mg (15 mg Intravenous Given 1/6/25 1339)   ondansetron (ZOFRAN) injection 4 mg (4 mg Intravenous Given 1/6/25 1338)   meclizine (ANTIVERT) tablet 25 mg (25 mg Oral Given  25 [id number])       ED Risk Strat Scores                          SBIRT 22yo+      Flowsheet Row Most Recent Value   Initial Alcohol Screen: US AUDIT-C     1. How often do you have a drink containing alcohol? 0 Filed at: 2025 4586   2. How many drinks containing alcohol do you have on a typical day you are drinking?  0 Filed at: 2025 8360   3b. FEMALE Any Age, or MALE 65+: How often do you have 4 or more drinks on one occassion? 0 Filed at: 2025 1159   Audit-C Score 0 Filed at: 2025 4403   RBOEL: How many times in the past year have you...    Used an illegal drug or used a prescription medication for non-medical reasons? Never Filed at: 2025 7610                            History of Present Illness       Chief Complaint   Patient presents with    Back Pain     Pt c/o upper R back pain that began two days ago but since last night it now radiates around to chest. Pt states it is painful to talk and breathe. Pt denies injury.     Chest Pain       Past Medical History:   Diagnosis Date    Ear problems       Past Surgical History:   Procedure Laterality Date    KIDNEY STONE SURGERY      x2    MYRINGOTOMY W/ TUBES      ORTHOPEDIC SURGERY Right     Meniscus, ACL      Family History   Problem Relation Age of Onset    Breast cancer Mother     Coronary artery disease Father     Seizures Brother       Social History     Tobacco Use    Smoking status: Former     Current packs/day: 0.00     Types: Cigarettes     Quit date:      Years since quittin.0    Smokeless tobacco: Never    Tobacco comments:     Social, Stress    Vaping Use    Vaping status: Never Used   Substance Use Topics    Alcohol use: Yes     Comment: Once per week    Drug use: Not Currently      E-Cigarette/Vaping    E-Cigarette Use Never User       E-Cigarette/Vaping Substances    Nicotine No     THC No     CBD No     Flavoring No     Other No     Unknown No       I have reviewed and agree with the history as documented.      Patient presents with R sided chest and back pain. Pleuritic in nature.       Back Pain  Associated symptoms: chest pain    Chest Pain  Associated symptoms: back pain and cough    Associated symptoms: no palpitations and no shortness of breath        Review of Systems   Respiratory:  Positive for cough. Negative for chest tightness and shortness of breath.    Cardiovascular:  Positive for chest pain. Negative for palpitations.   Musculoskeletal:  Positive for back pain.           Objective       ED Triage Vitals   Temperature Pulse Blood Pressure Respirations SpO2 Patient Position - Orthostatic VS   01/06/25 1151 01/06/25 1151 01/06/25 1151 01/06/25 1151 01/06/25 1151 01/06/25 1151   97.7 °F (36.5 °C) 80 152/90 18 100 % Sitting      Temp Source Heart Rate Source BP Location FiO2 (%) Pain Score    01/06/25 1151 01/06/25 1151 01/06/25 1151 -- 01/06/25 1339    Temporal Monitor Left arm  10 - Worst Possible Pain      Vitals      Date and Time Temp Pulse SpO2 Resp BP Pain Score FACES Pain Rating User   01/06/25 1500 -- 69 99 % 22 119/70 -- -- TB   01/06/25 1449 -- -- -- -- -- 3 -- TB   01/06/25 1400 -- 70 98 % 19 114/68 -- -- VMW   01/06/25 1339 -- -- -- -- -- 10 - Worst Possible Pain -- TB   01/06/25 1151 97.7 °F (36.5 °C) 80 100 % 18 152/90 -- -- KW            Physical Exam  Vitals and nursing note reviewed.   Constitutional:       General: She is not in acute distress.     Appearance: Normal appearance.   HENT:      Head: Normocephalic and atraumatic.      Right Ear: External ear normal.      Left Ear: External ear normal.      Nose: Nose normal.   Cardiovascular:      Rate and Rhythm: Normal rate and regular rhythm.   Pulmonary:      Effort: Pulmonary effort is normal. No tachypnea or accessory muscle usage.      Breath sounds: Decreased breath sounds present.   Chest:      Chest wall: Tenderness present.   Abdominal:      General: There is no distension.      Palpations: Abdomen is soft.      Tenderness: There  is no abdominal tenderness.   Musculoskeletal:      Right lower leg: No edema.      Left lower leg: No edema.   Skin:     General: Skin is warm and dry.   Neurological:      General: No focal deficit present.      Mental Status: She is alert and oriented to person, place, and time. Mental status is at baseline.   Psychiatric:         Behavior: Behavior normal.         Results Reviewed       Procedure Component Value Units Date/Time    HS Troponin I 2hr [[id number]] Collected: 01/06/25 1530    Lab Status: Final result Specimen: Blood from Arm, Left Updated: 01/06/25 1609     hs TnI 2hr <2 ng/L      Delta 2hr hsTnI --    HS Troponin I 4hr [[id number]]     Lab Status: No result Specimen: Blood     D-Dimer [[id number]]  (Normal) Collected: 01/06/25 1305    Lab Status: Final result Specimen: Blood from Arm, Left Updated: 01/06/25 1417     D-Dimer, Quant 0.31 ug/ml FEU     UA (URINE) with reflex to Scope [[id number]] Collected: 01/06/25 1336    Lab Status: Final result Specimen: Urine, Clean Catch Updated: 01/06/25 1400     Color, UA Light Yellow     Clarity, UA Clear     Specific Gravity, UA 1.022     pH, UA 5.5     Leukocytes, UA Negative     Nitrite, UA Negative     Protein, UA Negative mg/dl      Glucose, UA Negative mg/dl      Ketones, UA Negative mg/dl      Urobilinogen, UA <2.0 mg/dl      Bilirubin, UA Negative     Occult Blood, UA Negative    FLU/COVID Rapid Antigen (30 min. TAT) - Preferred screening test in ED [[id number]]  (Normal) Collected: 01/06/25 1305    Lab Status: Final result Specimen: Nares from Nose Updated: 01/06/25 1351     SARS COV Rapid Antigen Negative     Influenza A Rapid Antigen Negative     Influenza B Rapid Antigen Negative    Narrative:      This test has been performed using the [employer] Laura 2 FLU+SARS Antigen test under the Emergency Use Authorization (EUA). This test has been validated by the  and verified by the performing laboratory. The Laura uses lateral flow immunofluorescent  sandwich assay to detect SARS-COV, Influenza A and Influenza B Antigen.     The [employer] Laura 2 SARS Antigen test does not differentiate between SARS-CoV and SARS-CoV-2.     Negative results are presumptive and may be confirmed with a molecular assay, if necessary, for patient management. Negative results do not rule out SARS-CoV-2 or influenza infection and should not be used as the sole basis for treatment or patient management decisions. A negative test result may occur if the level of antigen in a sample is below the limit of detection of this test.     Positive results are indicative of the presence of viral antigens, but do not rule out bacterial infection or co-infection with other viruses.     All test results should be used as an adjunct to clinical observations and other information available to the provider.    FOR PEDIATRIC PATIENTS - copy/paste COVID Guidelines URL to browser: [email or web address]    POCT pregnancy, urine [[id number]]  (Normal) Collected: 01/06/25 1346    Lab Status: Final result Updated: 01/06/25 1346     EXT Preg Test, Ur Negative     Control Valid    HS Troponin 0hr (reflex protocol) [[id number]]  (Normal) Collected: 01/06/25 1305    Lab Status: Final result Specimen: Blood from Arm, Left Updated: 01/06/25 1346     hs TnI 0hr <2 ng/L     Comprehensive metabolic panel [[id number]] Collected: 01/06/25 1305    Lab Status: Final result Specimen: Blood from Arm, Left Updated: 01/06/25 1338     Sodium 137 mmol/L      Potassium 4.2 mmol/L      Chloride 104 mmol/L      CO2 27 mmol/L      ANION GAP 6 mmol/L      BUN 15 mg/dL      Creatinine 0.69 mg/dL      Glucose 84 mg/dL      Calcium 9.2 mg/dL      AST 14 U/L      ALT 18 U/L      Alkaline Phosphatase 85 U/L      Total Protein 7.3 g/dL      Albumin 4.5 g/dL      Total Bilirubin 0.44 mg/dL      eGFR 113 ml/min/1.73sq m     Narrative:      National Kid[employer] guidelines for  Chronic Kidney Disease (CKD):     Stage 1 with normal or high GFR (GFR > 90 mL/min/1.73 square meters)    Stage 2 Mild CKD (GFR = 60-89 mL/min/1.73 square meters)    Stage 3A Moderate CKD (GFR = 45-59 mL/min/1.73 square meters)    Stage 3B Moderate CKD (GFR = 30-44 mL/min/1.73 square meters)    Stage 4 Severe CKD (GFR = 15-29 mL/min/1.73 square meters)    Stage 5 End Stage CKD (GFR <15 mL/min/1.73 square meters)  Note: GFR calculation is accurate only with a steady state creatinine    Lipase [[id number]]  (Normal) Collected: 01/06/25 1305    Lab Status: Final result Specimen: Blood from Arm, Left Updated: 01/06/25 1338     Lipase 19 u/L     CBC and differential [[id number]] Collected: 01/06/25 1305    Lab Status: Final result Specimen: Blood from Arm, Left Updated: 01/06/25 1316     WBC 9.55 Thousand/uL      RBC 4.68 Million/uL      Hemoglobin 13.7 g/dL      Hematocrit 40.6 %      MCV 87 fL      MCH 29.3 pg      MCHC 33.7 g/dL      RDW 12.5 %      MPV 10.3 fL      Platelets 311 Thousands/uL      nRBC 0 /100 WBCs      Segmented % 64 %      Immature Grans % 0 %      Lymphocytes % 25 %      Monocytes % 8 %      Eosinophils Relative 2 %      Basophils Relative 1 %      Absolute Neutrophils 6.12 Thousands/µL      Absolute Immature Grans 0.04 Thousand/uL      Absolute Lymphocytes 2.36 Thousands/µL      Absolute Monocytes 0.77 Thousand/µL      Eosinophils Absolute 0.19 Thousand/µL      Basophils Absolute 0.07 Thousands/µL             XR chest 1 view portable   Final Interpretation by Micky Mayberry MD (01/06 1629)      No acute cardiopulmonary disease.            Workstation performed: [id number]             Procedures    ED Medication and Procedure Management   Prior to Admission Medications   Prescriptions Last Dose Informant Patient Reported? Taking?   famotidine (PEPCID) 20 mg tablet   No No   Sig: Take 1 tablet (20 mg total) by mouth 2 (two) times a day      Facility-Administered Medications: None     Discharge  Medication List as of 1/6/2025  4:17 PM        CONTINUE these medications which have NOT CHANGED    Details   famotidine (PEPCID) 20 mg tablet Take 1 tablet (20 mg total) by mouth 2 (two) times a day, Starting Fri 3/31/2023, Normal           No discharge procedures on file.  ED SEPSIS DOCUMENTATION   Time reflects when diagnosis was documented in both MDM as applicable and the Disposition within this note       Time User Action Codes Description Comment    1/6/2025  4:16 PM Thalia Chadwick Add [M94.0] Costochondritis                  Thalia Chadwick MD  01/06/25 1348

## 2025-01-06 NOTE — DISCHARGE INSTRUCTIONS
You were seen and evaluated today for chest pain.  Your test results demonstrated normal cardiac enyzmes, normal chest xray.   Please take all medications as instructed. Follow up with your PCP as discussed.   RETURN TO THE EMERGENCY DEPARTMENT if you develop new or worsening symptoms and are unable to see your PCP.

## 2025-01-07 LAB
ATRIAL RATE: 72 BPM
P AXIS: 63 DEGREES
PR INTERVAL: 168 MS
QRS AXIS: -18 DEGREES
QRSD INTERVAL: 86 MS
QT INTERVAL: 390 MS
QTC INTERVAL: 427 MS
T WAVE AXIS: 4 DEGREES
VENTRICULAR RATE: 72 BPM

## 2025-01-07 PROCEDURE — 93010 ELECTROCARDIOGRAM REPORT: CPT | Performed by: INTERNAL MEDICINE
